# Patient Record
Sex: FEMALE | Race: WHITE | URBAN - METROPOLITAN AREA
[De-identification: names, ages, dates, MRNs, and addresses within clinical notes are randomized per-mention and may not be internally consistent; named-entity substitution may affect disease eponyms.]

---

## 2022-03-20 PROBLEM — G43.711 CHRONIC MIGRAINE WITHOUT AURA, WITH INTRACTABLE MIGRAINE, SO STATED, WITH STATUS MIGRAINOSUS: Status: ACTIVE | Noted: 2021-02-23

## 2022-09-12 RX ORDER — ERENUMAB-AOOE 140 MG/ML
140 INJECTION, SOLUTION SUBCUTANEOUS
Qty: 1 ADJUSTABLE DOSE PRE-FILLED PEN SYRINGE | Refills: 11 | Status: SHIPPED | OUTPATIENT
Start: 2022-09-12 | End: 2022-09-21 | Stop reason: ALTCHOICE

## 2022-09-19 ENCOUNTER — OFFICE VISIT (OUTPATIENT)
Dept: NEUROLOGY | Age: 47
End: 2022-09-19
Payer: COMMERCIAL

## 2022-09-19 VITALS
SYSTOLIC BLOOD PRESSURE: 134 MMHG | DIASTOLIC BLOOD PRESSURE: 83 MMHG | WEIGHT: 215 LBS | BODY MASS INDEX: 39.56 KG/M2 | HEIGHT: 62 IN | HEART RATE: 108 BPM

## 2022-09-19 DIAGNOSIS — R41.3 MEMORY DIFFICULTIES: ICD-10-CM

## 2022-09-19 DIAGNOSIS — R20.0 LEFT LEG NUMBNESS: ICD-10-CM

## 2022-09-19 DIAGNOSIS — R29.898 LEFT LEG WEAKNESS: Primary | ICD-10-CM

## 2022-09-19 DIAGNOSIS — G43.711 CHRONIC MIGRAINE WITHOUT AURA, WITH INTRACTABLE MIGRAINE, SO STATED, WITH STATUS MIGRAINOSUS: ICD-10-CM

## 2022-09-19 PROCEDURE — 99215 OFFICE O/P EST HI 40 MIN: CPT | Performed by: PSYCHIATRY & NEUROLOGY

## 2022-09-19 ASSESSMENT — ENCOUNTER SYMPTOMS
NAUSEA: 1
PHOTOPHOBIA: 1
RESPIRATORY NEGATIVE: 1
VOMITING: 1

## 2022-09-19 ASSESSMENT — VISUAL ACUITY: VA_NORMAL: 1

## 2022-09-19 NOTE — PROGRESS NOTES
9/21/2022  [facility] Tesfaye 50 y.o. female      Chief Complaint:  Chief Complaint   Patient presents with    Follow-up    Migraine          Followup Note:   Began in June 2022, experienced numbness and weakness of the left leg, lasted 3 weeks and then gradually better. No pain. Numbness in medial ankle extending to medial thigh asso with heaviness of the leg. Weakness currently subtle, denied spinal pain. Migraine 3-4 days per month, duration 2-3 days so 8 days per month, more HA at the end of the month after Aimovig 140. Discussed Ajovy to cover full month. Previous triptan Zomig gave her tightness of throat. Hx breast cancer with chemothx, radiation and surgical.      Review Test Results: I have reviewed imaging study and lab tests, discussed results with patient in detail. Current Outpatient Medications   Medication Sig Dispense Refill    Fremanezumab-vfrm (AJOVY) 225 MG/1.5ML SOAJ Inject 225 mg into the skin every 30 days 1 Adjustable Dose Pre-filled Pen Syringe 11    Ubrogepant 100 MG TABS Take 100 mg by mouth as needed       No current facility-administered medications for this visit. Allergies   Allergen Reactions    Promethazine Other (See Comments)     Hypotension    Vancomycin Rash         Review of Systems:  Review of Systems   Musculoskeletal:  Negative for back pain and neck pain. Neurological:  Positive for sensory change, weakness and headaches. Examination:  Vitals:    09/19/22 1543   BP: 134/83   Pulse: (!) 108   Weight: 215 lb (97.5 kg)   Height: 5' 2\" (1.575 m)        Physical Exam  Vitals reviewed. Constitutional:       Appearance: She is normal weight. HENT:      Head: Normocephalic. Eyes:      Extraocular Movements: Extraocular movements intact and EOM normal.      Conjunctiva/sclera: Conjunctivae normal.      Pupils: Pupils are equal, round, and reactive to light. Cardiovascular:      Rate and Rhythm: Tachycardia present.    Pulmonary:      Effort: Pulmonary effort is normal.   Musculoskeletal:      Cervical back: Normal range of motion. Neurological:      Mental Status: She is alert and oriented to person, place, and time. Mental status is at baseline. Cranial Nerves: No cranial nerve deficit. Sensory: No sensory deficit. Motor: No weakness. Coordination: Coordination normal. Finger-Nose-Finger Test, Heel to [employer] and Romberg Test normal.      Gait: Gait is intact. Gait and tandem walk normal.   Psychiatric:         Mood and Affect: Mood normal.         Speech: Speech normal.         Behavior: Behavior normal.         Thought Content: Thought content normal.         Judgment: Judgment normal.        Neurologic Exam     Mental Status   Oriented to person, place, and time. Concentration: normal.   Speech: speech is normal   Level of consciousness: alert  Knowledge: good. Normal comprehension. Cranial Nerves     CN II   Visual fields full to confrontation. Visual acuity: normal  Right visual field deficit: none quadrant(s)  Left visual field deficit: none quadrant(s)    CN III, IV, VI   Pupils are equal, round, and reactive to light. Extraocular motions are normal.   Right pupil: Size: 3 mm. Shape: regular. Left pupil: Size: 3 mm. Shape: regular. Nystagmus: none   Diplopia: none  Ophthalmoparesis: none  Upgaze: normal    CN V   Facial sensation intact. CN VII   Facial expression full, symmetric. CN VIII   Hearing: impaired    CN IX, X   CN IX normal.   CN X normal.     CN XI   CN XI normal.     CN XII   CN XII normal.     Motor Exam   Muscle bulk: normal  Overall muscle tone: normal  Right arm pronator drift: absent    Strength   Strength 5/5 except as noted.    Left iliopsoas: 4/5  Left quadriceps: 4/5  Right handed, finger tapping normal.      Sensory Exam   Light touch normal.   Right leg vibration: normal  Left leg vibration: normal  Pinprick normal.     Gait, Coordination, and Reflexes     Gait  Gait:

## 2022-09-19 NOTE — PROGRESS NOTES
9/19/2022  Portia Carlin 50 y.o. female      Chief Complaint:  Chief Complaint   Patient presents with    Follow-up    Migraine          Followup Note:       Review Test Results: I have reviewed imaging study and lab tests, discussed results with patient in detail. All questions were answered. *** minutes spent. Current Outpatient Medications   Medication Sig Dispense Refill    Ubrogepant 100 MG TABS Take 100 mg by mouth as needed      Erenumab-aooe (AIMOVIG) 140 MG/ML SOAJ Inject 140 mg into the skin every 30 days 1 Adjustable Dose Pre-filled Pen Syringe 11     No current facility-administered medications for this visit. Not on File      Review of Systems:  Review of Systems   Constitutional: Negative. HENT:  Positive for hearing loss. Eyes:  Positive for photophobia. Respiratory: Negative. Cardiovascular: Negative. Gastrointestinal:  Positive for nausea and vomiting. Genitourinary: Negative. Musculoskeletal: Negative. Skin: Negative. Neurological:  Positive for dizziness and headaches. Endo/Heme/Allergies: Negative. Psychiatric/Behavioral: Negative. No flowsheet data found. No flowsheet data found. Examination:  There were no vitals filed for this visit. Physical Exam     Neurologic Exam      Assessment / Plan:    There are no diagnoses linked to this encounter. I have spent *** min, greater than 50% of discussing and counseling with patient, for treatment and diagnostic plan review.

## 2022-09-21 PROBLEM — R20.0 LEFT LEG NUMBNESS: Status: ACTIVE | Noted: 2022-09-21

## 2022-09-21 PROBLEM — R29.898 LEFT LEG WEAKNESS: Status: ACTIVE | Noted: 2022-09-21

## 2022-09-21 PROBLEM — R41.3 MEMORY DIFFICULTIES: Status: ACTIVE | Noted: 2022-09-21

## 2022-09-21 RX ORDER — FREMANEZUMAB-VFRM 225 MG/1.5ML
225 INJECTION SUBCUTANEOUS
Qty: 1 ADJUSTABLE DOSE PRE-FILLED PEN SYRINGE | Refills: 11 | Status: SHIPPED | OUTPATIENT
Start: 2022-09-21

## 2022-09-21 ASSESSMENT — ENCOUNTER SYMPTOMS: BACK PAIN: 0

## 2022-09-23 DIAGNOSIS — G43.711 CHRONIC MIGRAINE WITHOUT AURA, WITH INTRACTABLE MIGRAINE, SO STATED, WITH STATUS MIGRAINOSUS: Primary | ICD-10-CM

## 2022-09-23 NOTE — TELEPHONE ENCOUNTER
Patient thought you were going to send Nurtec over to her pharmacy on Monday when she was here. Please advise.

## 2022-09-24 RX ORDER — RIMEGEPANT SULFATE 75 MG/75MG
75 TABLET, ORALLY DISINTEGRATING ORAL PRN
Qty: 14 TABLET | Refills: 11 | Status: SHIPPED | OUTPATIENT
Start: 2022-09-24

## 2022-09-30 ENCOUNTER — HOSPITAL ENCOUNTER (OUTPATIENT)
Dept: MRI IMAGING | Age: 47
Discharge: HOME | End: 2022-10-03
Payer: COMMERCIAL

## 2022-09-30 DIAGNOSIS — R20.0 LEFT LEG NUMBNESS: ICD-10-CM

## 2022-09-30 DIAGNOSIS — R29.898 LEFT LEG WEAKNESS: ICD-10-CM

## 2022-09-30 PROCEDURE — 72148 MRI LUMBAR SPINE W/O DYE: CPT

## 2022-10-05 ENCOUNTER — OFFICE VISIT (OUTPATIENT)
Dept: NEUROLOGY | Age: 47
End: 2022-10-05
Payer: COMMERCIAL

## 2022-10-05 VITALS
HEART RATE: 73 BPM | WEIGHT: 215 LBS | BODY MASS INDEX: 39.32 KG/M2 | DIASTOLIC BLOOD PRESSURE: 79 MMHG | SYSTOLIC BLOOD PRESSURE: 137 MMHG

## 2022-10-05 DIAGNOSIS — R29.898 LEFT LEG WEAKNESS: ICD-10-CM

## 2022-10-05 DIAGNOSIS — G62.9 SENSORY NEUROPATHY: ICD-10-CM

## 2022-10-05 DIAGNOSIS — R20.0 LEFT LEG NUMBNESS: Primary | ICD-10-CM

## 2022-10-05 PROCEDURE — 95886 MUSC TEST DONE W/N TEST COMP: CPT | Performed by: PSYCHIATRY & NEUROLOGY

## 2022-10-05 PROCEDURE — 95910 NRV CNDJ TEST 7-8 STUDIES: CPT | Performed by: PSYCHIATRY & NEUROLOGY

## 2022-10-05 NOTE — PROGRESS NOTES
[address], [address], [address] [facility]       Nerve Conduction Study and Electromyogram Report           Hx: 50 y.o. female with complaint of weakness of left leg and numbness in medial aspect of the left leg occurring in June and lasted for about 3 weeks, pretty much subsided. No LBP. Symptoms have been present for 3 weeks  Significant past medical history includes chemothx for breast cancer in 2012. No diabetes. Clinical summary was reviewed. MRI LS reviewed with pt, unremarkable. Neurological examination: Alert and coherent with normal speech. Pupils 3 mm equally reactive to light. Central and peripheral visual fields were both normal bilaterally. Extraocular movement was intact. Facial sensory and movement, tongue protrusion were normal.  Hearing, uvula elevation and trapezius were normal bilaterally. There was no drift of upper limbs. Fine motor skills were normal.  No involuntary movement. Tone and volume of muscles were normal.  Strength 5/5 on bilateral upper and right lower limb; left lower limb slight weakness of foot dorso and plantar flexion but able to reach normal when fully activated. Proximal muscle groups including thigh abduction and adduction were all normal.  Deep tendon reflexes were 2 and symmetrical except ankles 1, toe sign downgoing bilaterally. Light touch, pinprick and vibratory sensation were normal.  Gait was stable. Description: Nerve conduction studies were performed on the right lower extremity and left lower extremity, using standard technique. Left sural nerve showed no response; right sural nerve was normal.  Right plantar nerve showed no response. Left peroneal motor amplitude borderline 2.5 mV, CV normal; right was normal.  Tibial motor was normal.  F-wave latency normal 41.9-42.7 MS. H reflex response was absent bilaterally.         Needle electromyography was performed on the left lower extremity and lumbar paraspinal muscles, using standard concentric electrodes. Results were normal.        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 Above findings could not explain her numbness in saphenous nerve distribution but it has improved spontaneously. Needle EMG to the left lower limb and lumbar paraspinal muscles was normal showing no evidence of femoral/sciatic/peroneal neuropathy, L3/4/5/S1 radiculopathy or plexopathy. No myopathic process.            Procedure Details: Under procedure category          Vicenta Raines MD

## 2023-09-19 ENCOUNTER — TELEPHONE (OUTPATIENT)
Dept: NEUROLOGY | Age: 48
End: 2023-09-19

## 2023-09-19 NOTE — TELEPHONE ENCOUNTER
PT is needing refill of Ajovy injection/pt is scheduled for her NTP appt on 12/1/23/send to [facility]     [id number]- [place], [place] - [id number] [facility] -  [phone]

## 2023-12-01 ENCOUNTER — OFFICE VISIT (OUTPATIENT)
Dept: NEUROLOGY | Age: 48
End: 2023-12-01
Payer: COMMERCIAL

## 2023-12-01 VITALS
HEART RATE: 73 BPM | WEIGHT: 173 LBS | SYSTOLIC BLOOD PRESSURE: 123 MMHG | OXYGEN SATURATION: 98 % | BODY MASS INDEX: 31.64 KG/M2 | DIASTOLIC BLOOD PRESSURE: 81 MMHG

## 2023-12-01 DIAGNOSIS — G62.9 NEUROPATHY: ICD-10-CM

## 2023-12-01 DIAGNOSIS — G43.009 MIGRAINE WITHOUT AURA AND WITHOUT STATUS MIGRAINOSUS, NOT INTRACTABLE: Primary | ICD-10-CM

## 2023-12-01 PROBLEM — G43.711 CHRONIC MIGRAINE WITHOUT AURA, WITH INTRACTABLE MIGRAINE, SO STATED, WITH STATUS MIGRAINOSUS: Status: RESOLVED | Noted: 2021-02-23 | Resolved: 2023-12-01

## 2023-12-01 PROCEDURE — 99214 OFFICE O/P EST MOD 30 MIN: CPT | Performed by: NURSE PRACTITIONER

## 2023-12-01 RX ORDER — DULOXETIN HYDROCHLORIDE 20 MG/1
20 CAPSULE, DELAYED RELEASE ORAL DAILY
Qty: 30 CAPSULE | Refills: 11 | Status: SHIPPED | OUTPATIENT
Start: 2023-12-01

## 2023-12-01 RX ORDER — FREMANEZUMAB-VFRM 225 MG/1.5ML
225 INJECTION SUBCUTANEOUS
Qty: 1 ADJUSTABLE DOSE PRE-FILLED PEN SYRINGE | Refills: 11 | Status: SHIPPED | OUTPATIENT
Start: 2023-12-01

## 2023-12-01 RX ORDER — RIMEGEPANT SULFATE 75 MG/75MG
75 TABLET, ORALLY DISINTEGRATING ORAL PRN
Qty: 14 TABLET | Refills: 11 | Status: SHIPPED | OUTPATIENT
Start: 2023-12-01

## 2023-12-01 ASSESSMENT — ENCOUNTER SYMPTOMS
VOMITING: 1
PHOTOPHOBIA: 1
NAUSEA: 1

## 2023-12-01 NOTE — ASSESSMENT & PLAN NOTE
Start cymbalta 20mg once daily. Advised that the typical dose for pain reduction is 60mg. Will start on the 20mg as this is only to be taken once daily. She is concerned about taking something BID, such as gabapentin.  So will start with duloxetine first.

## 2023-12-01 NOTE — ASSESSMENT & PLAN NOTE
Patient's migraines reviewed present reduced combination of Ajovy and Nurtec for rescue. Refills provided. Continue current plan.

## 2024-01-02 RX ORDER — DULOXETIN HYDROCHLORIDE 20 MG/1
20 CAPSULE, DELAYED RELEASE ORAL DAILY
Qty: 90 CAPSULE | Refills: 3 | Status: SHIPPED | OUTPATIENT
Start: 2024-01-02

## 2024-08-22 ENCOUNTER — HOSPITAL ENCOUNTER (EMERGENCY)
Age: 49
Discharge: HOME | End: 2024-08-22
Attending: EMERGENCY MEDICINE | Admitting: INTERNAL MEDICINE
Payer: COMMERCIAL

## 2024-08-22 VITALS
DIASTOLIC BLOOD PRESSURE: 53 MMHG | TEMPERATURE: 98.2 F | OXYGEN SATURATION: 97 % | SYSTOLIC BLOOD PRESSURE: 102 MMHG | RESPIRATION RATE: 14 BRPM | HEART RATE: 83 BPM

## 2024-08-22 DIAGNOSIS — R55 SYNCOPE AND COLLAPSE: Primary | ICD-10-CM

## 2024-08-22 LAB
ALBUMIN SERPL-MCNC: 4 G/DL (ref 3.5–5)
ALBUMIN/GLOB SERPL: 1.3 (ref 1–1.9)
ALP SERPL-CCNC: 91 U/L (ref 35–104)
ALT SERPL-CCNC: 8 U/L (ref 12–65)
ANION GAP SERPL CALC-SCNC: 12 MMOL/L (ref 9–18)
AST SERPL-CCNC: 25 U/L (ref 15–37)
BASOPHILS # BLD: 0 K/UL (ref 0–0.2)
BASOPHILS NFR BLD: 0 % (ref 0–2)
BILIRUB SERPL-MCNC: 0.6 MG/DL (ref 0–1.2)
BUN SERPL-MCNC: 17 MG/DL (ref 6–23)
CALCIUM SERPL-MCNC: 9.8 MG/DL (ref 8.8–10.2)
CHLORIDE SERPL-SCNC: 103 MMOL/L (ref 98–107)
CO2 SERPL-SCNC: 25 MMOL/L (ref 20–28)
CREAT SERPL-MCNC: 0.79 MG/DL (ref 0.6–1.1)
DIFFERENTIAL METHOD BLD: NORMAL
EOSINOPHIL # BLD: 0.1 K/UL (ref 0–0.8)
EOSINOPHIL NFR BLD: 1 % (ref 0.5–7.8)
ERYTHROCYTE [DISTWIDTH] IN BLOOD BY AUTOMATED COUNT: 12.9 % (ref 11.9–14.6)
GLOBULIN SER CALC-MCNC: 3.2 G/DL (ref 2.3–3.5)
GLUCOSE BLD STRIP.AUTO-MCNC: 140 MG/DL (ref 65–100)
GLUCOSE SERPL-MCNC: 120 MG/DL (ref 70–99)
HCT VFR BLD AUTO: 41.6 % (ref 35.8–46.3)
HGB BLD-MCNC: 13.9 G/DL (ref 11.7–15.4)
IMM GRANULOCYTES # BLD AUTO: 0 K/UL (ref 0–0.5)
IMM GRANULOCYTES NFR BLD AUTO: 0 % (ref 0–5)
LYMPHOCYTES # BLD: 2.7 K/UL (ref 0.5–4.6)
LYMPHOCYTES NFR BLD: 39 % (ref 13–44)
MCH RBC QN AUTO: 29.1 PG (ref 26.1–32.9)
MCHC RBC AUTO-ENTMCNC: 33.4 G/DL (ref 31.4–35)
MCV RBC AUTO: 87 FL (ref 82–102)
MONOCYTES # BLD: 0.7 K/UL (ref 0.1–1.3)
MONOCYTES NFR BLD: 9 % (ref 4–12)
NEUTS SEG # BLD: 3.5 K/UL (ref 1.7–8.2)
NEUTS SEG NFR BLD: 51 % (ref 43–78)
NRBC # BLD: 0 K/UL (ref 0–0.2)
PLATELET # BLD AUTO: 248 K/UL (ref 150–450)
PMV BLD AUTO: 10.5 FL (ref 9.4–12.3)
POTASSIUM SERPL-SCNC: 3.7 MMOL/L (ref 3.5–5.1)
PROT SERPL-MCNC: 7.3 G/DL (ref 6.3–8.2)
RBC # BLD AUTO: 4.78 M/UL (ref 4.05–5.2)
SERVICE CMNT-IMP: ABNORMAL
SODIUM SERPL-SCNC: 140 MMOL/L (ref 136–145)
WBC # BLD AUTO: 7 K/UL (ref 4.3–11.1)

## 2024-08-22 PROCEDURE — 80053 COMPREHEN METABOLIC PANEL: CPT

## 2024-08-22 PROCEDURE — 99284 EMERGENCY DEPT VISIT MOD MDM: CPT

## 2024-08-22 PROCEDURE — 96360 HYDRATION IV INFUSION INIT: CPT

## 2024-08-22 PROCEDURE — 85025 COMPLETE CBC W/AUTO DIFF WBC: CPT

## 2024-08-22 PROCEDURE — 2580000003 HC RX 258: Performed by: EMERGENCY MEDICINE

## 2024-08-22 PROCEDURE — 82962 GLUCOSE BLOOD TEST: CPT

## 2024-08-22 RX ORDER — 0.9 % SODIUM CHLORIDE 0.9 %
1000 INTRAVENOUS SOLUTION INTRAVENOUS ONCE
Status: COMPLETED | OUTPATIENT
Start: 2024-08-22 | End: 2024-08-22

## 2024-08-22 RX ADMIN — SODIUM CHLORIDE 1000 ML: 9 INJECTION, SOLUTION INTRAVENOUS at 20:54

## 2024-08-23 NOTE — ED NOTES
Patient mobility status  with no difficulty. Provider aware     I have reviewed discharge instructions with the patient.  The patient verbalized understanding.    Patient left ED via Discharge Method: ambulatory to Home with family    Opportunity for questions and clarification provided.     Patient given 0 scripts.           Karin Echevarria RN  08/22/24 2799

## 2024-08-23 NOTE — ED PROVIDER NOTES
Emergency Department Provider Note                   PCP:                Adeola Meglar MD               Age: 49 y.o.      Sex: female       ICD-10-CM    1. Syncope and collapse  R55           DISPOSITION Decision To Discharge 08/22/2024 10:07:02 PM  Condition at Disposition: Good        MDM  Number of Diagnoses or Management Options  Syncope and collapse  Diagnosis management comments: MEDICAL DECISION MAKING  Complexity of Problems Addressed:  1 or more acute illnesses that pose a threat to life or bodily function.     Data Reviewed and Analyzed:  Category 1:   I independently ordered and reviewed each unique test.    Category 2:   I independently ordered and interpreted the ED EKG in the absence of a Cardiologist.    Rate: 65  EKG Interpretation: EKG Interpretation: sinus rhythm, no evidence of arrhythmia  ST Segments: Normal ST segments - NO STEMI    Category 3: Discussion of management or test interpretation.  The patient presents after syncope while visiting a patient in the hospital. She appeared fully recovered on arrival to the emergency department. Blood work and EKG were unremarkable.  The patient received IV fluids and was observed.  She was feeling better after therapy.  This sounds like a vasovagal type episode with some hyperventilation.  Recommend rest and close follow-up.  The patient was in agreement this plan.    Risk of Complications and/or Morbidity of Patient Management:  Shared medical decision making was utilized in creating the patients health plan today.                Orders Placed This Encounter   Procedures    CBC with Auto Differential    Comprehensive Metabolic Panel w/ Reflex to MG    POCT Glucose    EKG 12 Lead        Medications   sodium chloride 0.9 % bolus 1,000 mL (0 mLs IntraVENous Stopped 8/22/24 3904)       New Prescriptions    No medications on file        Olamide Carlin is a 49 y.o. female who presents to the Emergency Department with chief complaint of    Chief Complaint

## 2024-10-20 DIAGNOSIS — G43.711 CHRONIC MIGRAINE WITHOUT AURA, INTRACTABLE, WITH STATUS MIGRAINOSUS: ICD-10-CM

## 2024-10-21 RX ORDER — FREMANEZUMAB-VFRM 225 MG/1.5ML
225 INJECTION SUBCUTANEOUS
Refills: 3 | OUTPATIENT
Start: 2024-10-21

## 2024-12-03 ENCOUNTER — OFFICE VISIT (OUTPATIENT)
Dept: NEUROLOGY | Age: 49
End: 2024-12-03
Payer: COMMERCIAL

## 2024-12-03 VITALS
DIASTOLIC BLOOD PRESSURE: 67 MMHG | BODY MASS INDEX: 34.75 KG/M2 | WEIGHT: 190 LBS | OXYGEN SATURATION: 96 % | SYSTOLIC BLOOD PRESSURE: 112 MMHG | HEART RATE: 76 BPM

## 2024-12-03 DIAGNOSIS — G43.009 MIGRAINE WITHOUT AURA AND WITHOUT STATUS MIGRAINOSUS, NOT INTRACTABLE: Primary | ICD-10-CM

## 2024-12-03 PROCEDURE — 99214 OFFICE O/P EST MOD 30 MIN: CPT | Performed by: NURSE PRACTITIONER

## 2024-12-03 RX ORDER — FREMANEZUMAB-VFRM 225 MG/1.5ML
225 INJECTION SUBCUTANEOUS
Qty: 1 ADJUSTABLE DOSE PRE-FILLED PEN SYRINGE | Refills: 11 | Status: SHIPPED | OUTPATIENT
Start: 2024-12-03

## 2024-12-03 RX ORDER — UBROGEPANT 100 MG/1
TABLET ORAL
Qty: 16 TABLET | Refills: 11 | Status: SHIPPED | OUTPATIENT
Start: 2024-12-03

## 2024-12-03 RX ORDER — RIMEGEPANT SULFATE 75 MG/75MG
75 TABLET, ORALLY DISINTEGRATING ORAL PRN
Qty: 14 TABLET | Refills: 11 | Status: CANCELLED | OUTPATIENT
Start: 2024-12-03

## 2024-12-03 ASSESSMENT — ENCOUNTER SYMPTOMS
PHOTOPHOBIA: 1
NAUSEA: 1
ABDOMINAL PAIN: 1
VOMITING: 1

## 2024-12-03 NOTE — ASSESSMENT & PLAN NOTE
Patient with greater than 75% reduction in migraine frequency on Ajovy.  As monotherapy.  Medically necessary to continue Ajovy once monthly for migraine prevention.      Discontinue Nurtec.  Will change rescue therapy to Ubrelvy. Take 1 tablet at onset of migraine, may be repeated in 2 hours for a max of 2 doses in 24 hours.

## 2024-12-03 NOTE — PROGRESS NOTES
Olamide Carlin is a 49 y.o. female  Significant past medical history includes chemothx for breast cancer in 2012 who presents with migraine    CC: Headache      Previously saw Dr. Juanjose Moon        HPI:        Since last visit, she reports 1 migraine per month which is responsive to Nurtec.  Upon further questioning, she has been dosing Nurtec twice within 24 hours.  Advised that these are not directions for use and we can consider switching therapies.  Referral no intolerances to Ajovy      Headaches are located left frontal and radiate as they escalate in intensity radiate to eyes and back of head.  They began teenage years.   She was having 1-2x per week lasting 3-4 days per episode prior to Ajovy.  Duration: they would last three days prior to Ajovy, will still last a few days if she has a breakthrough.  Severity is mild to moderate now. Quality of headaches are described as pulsating pain/pressure.  Associated symptoms: nausea/vomiting/photophobia, phonophobia, no dizziness, neck pain.  The headaches are exacerbated by movement.   Factors that relieve the headaches are has to lie down.  Most bothersome migraine symptoms nausea. Previous Imaging Mri brain.       History of breast cancer, bilateral masectomy.       She sleeps well at night. She reports a 25lb weight gain since I saw her last, mostly feels \"grumpy\" due to weight gain. She had colitis in January and notes her stomach hasn't improved since then. She has trouble digesting lettuce, carrots, healthier items.  Colonoscopy pending January 2025.        P O U N D    HEADACHE     5 / 5.     P ulsatile :  yes    O nset :  yes      Unilateral :  yes        N ausea / vomit :  yes         D ebility :  yes  Positive = 3 or more.    For migraine type headaches.       MIGRAINE  PHOBIAS :    2 / 5.                 PHOTO :  yes                  PHONO :  yes                   OSMO :  no                     TACTILE :no                         VESTIBULO :

## 2024-12-20 ENCOUNTER — TELEPHONE (OUTPATIENT)
Dept: NEUROLOGY | Age: 49
End: 2024-12-20

## 2025-02-25 ENCOUNTER — TELEPHONE (OUTPATIENT)
Dept: NEUROLOGY | Age: 50
End: 2025-02-25